# Patient Record
(demographics unavailable — no encounter records)

---
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
## 2025-05-14 NOTE — ASSESSMENT
[FreeTextEntry1] : 40F with LBP and BLLE radic L>R with HNP at L5-s1 asymmetric to right PT c/w nsaids and muscle relaxants Pain management referral FU after trial of PASQUALE

## 2025-05-14 NOTE — IMAGING
[de-identified] : Spine: Inspection/Palpation: No tenderness to palpation throughout Cervical/thoracic/lumbar spine. No bony stepoffs, No lesions.  Gait: Non-antalgic, able to perform bilateral toe and heel rise.    Range of Motion: Lumbar Spine: Flexion to 60 degrees, Extension to 30 degrees,   Neurologic:  Bilateral Lower Extremities 5/5 Iliopsoas/Quadriceps/Hamstrings/ Tibialis Anterior/ Gastrocnemius. Extensor Hallucis Longus/ Flexor Hallucis Longus except    Sensation intact to light touch L2-S1  different sensation on right thigh       No pain with IR/ER/Flexion of HIps bilaterally   MRI Lumbar spine reviewed and interpreted independently.  Agree with report as follows. 1. Modic type I endplate changes at L5-S1. 2. At L4-5: Asymmetric disc bulge with a right mildly narrowing the adjacent foramen. 3. At L5-S1: Disc bulge with a superimposed right lateralizing disc herniation with a posterior annular fissure encroaching upon the descending right S1 nerve root within the subarticular recess and resulting in moderate right and mild left-sided neural foraminal narrowing.